# Patient Record
Sex: MALE | Race: WHITE | NOT HISPANIC OR LATINO | Employment: FULL TIME | URBAN - METROPOLITAN AREA
[De-identification: names, ages, dates, MRNs, and addresses within clinical notes are randomized per-mention and may not be internally consistent; named-entity substitution may affect disease eponyms.]

---

## 2017-08-16 ENCOUNTER — OFFICE VISIT (OUTPATIENT)
Dept: DERMATOLOGY | Facility: CLINIC | Age: 37
End: 2017-08-16

## 2017-08-16 DIAGNOSIS — D22.9 MULTIPLE NEVI: Primary | ICD-10-CM

## 2017-08-16 ASSESSMENT — PAIN SCALES - GENERAL: PAINLEVEL: NO PAIN (0)

## 2017-08-16 NOTE — PROGRESS NOTES
[facility] Dermatology Note      Dermatology Problem List:  1. Non inflamed cyst of left temple  2. Nevi  3. Family history of skin cancer    CC:   Chief Complaint   Patient presents with     Skin Check     Dmitri is here for a skin check, states he has a concerning spot on his left temple.               Encounter Date: Aug 16, 2017    History of Present Illness:  Mr. Dmitri Higginbotham is a 36 year old male who presents for a skin check.  His last skin check was in 2009 with Dr Veloz.  He had a rash of the axillae at that time that now he attributes to deoderants and he has been working with different products.  He is concerned about a lesion on the left temple.  He felt sometime ago he had an ingrown hair that became infected and since that time he has had a bump in that area.  He has some moles removed from his back in the past but none with atypia per patient.    Past Medical History:   There is no problem list on file for this patient.    History reviewed. No pertinent past medical history.  History reviewed. No pertinent surgical history.    Social History:  The patient denies use of tanning beds.    Family History:  There is a family history of non-melanoma skin cancer in the patient's maternal grandfather.    Medications:  Current Outpatient Prescriptions   Medication Sig Dispense Refill     Amphetamine-Dextroamphetamine (ADDERALL PO) Take by mouth as needed       No Known Allergies      Review of Systems:  -As per HPI  .  -Skin: As above in HPI. No additional skin concerns.    Physical exam:  Vitals: There were no vitals taken for this visit.  GEN: This is a well developed, well-nourished male in no acute distress, in a pleasant mood.    SKIN: Full skin, which includes the head/face, both arms, chest, back, abdomen,both legs, buttocks, digits and/or nails, was examined.  -Multiple regular brown pigmented macules and papules are identified on the face, trunk, and extremities.  Congenital  appearing nevi of back.  2 scars of the back but no pigment recurrence.   - cystic non inflamed papule of left temple with 2 closed comedones  -There are yellow oily papules with central umbilication located on the face.  -No other lesions of concern on areas examined.     Impression/Plan:  1. 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       2. Rash    Probable ACD to deoderents.  Reviewed patch testing if necessary    Patient doing OK with different products    3. Cyst    Cyst management options were reviewed. The patient elects for clinical monitoring.    Differin gel for comedone management nightly and may use comedone extractor.          Follow-up in 1 year, earlier for new or changing lesions.       Staff Involved:  Staff Only

## 2017-08-16 NOTE — NURSING NOTE
Dermatology Rooming Note    Dmitri Higginbotham's goals for this visit include:   Chief Complaint   Patient presents with     Skin Check     Dmitri is here for a skin check, states he has a concerning spot on his left temple.             Carol Harp LPN

## 2017-08-16 NOTE — LETTER
Date:August 17, 2017      Patient was self referred, no letter generated. Do not send.        [facility] Health Information

## 2017-08-16 NOTE — PATIENT INSTRUCTIONS
Differin gel for small cyst (blackhead) area on left cheek.  Use a small amount at bedtime.    Can use a comedone (blackhead) extractor to remove blackheads.

## 2017-08-16 NOTE — LETTER
8/16/2017       RE: Dmitri Higginbotham  [address]  [place] [address]     Dear Colleague,    Thank you for referring your patient, Dmitri Higginbotham, to the [facility] DERMATOLOGY at [facility]. Please see a copy of my visit note below.    [facility] Dermatology Note      Dermatology Problem List:  1. Non inflamed cyst of left temple  2. Nevi  3. Family history of skin cancer    CC:   Chief Complaint   Patient presents with     Skin Check     Dmitri is here for a skin check, states he has a concerning spot on his left temple.               Encounter Date: Aug 16, 2017    History of Present Illness:  Mr. Dmitri Higginbotham is a 36 year old male who presents for a skin check.  His last skin check was in 2009 with Dr Veloz.  He had a rash of the axillae at that time that now he attributes to deoderants and he has been working with different products.  He is concerned about a lesion on the left temple.  He felt sometime ago he had an ingrown hair that became infected and since that time he has had a bump in that area.  He has some moles removed from his back in the past but none with atypia per patient.    Past Medical History:   There is no problem list on file for this patient.    History reviewed. No pertinent past medical history.  History reviewed. No pertinent surgical history.    Social History:  The patient denies use of tanning beds.    Family History:  There is a family history of non-melanoma skin cancer in the patient's maternal grandfather.    Medications:  Current Outpatient Prescriptions   Medication Sig Dispense Refill     Amphetamine-Dextroamphetamine (ADDERALL PO) Take by mouth as needed       No Known Allergies      Review of Systems:  -As per HPI  .  -Skin: As above in HPI. No additional skin concerns.    Physical exam:  Vitals: There were no vitals taken for this visit.  GEN: This is a well developed, well-nourished male in no acute distress, in a  pleasant mood.    SKIN: Full skin, which includes the head/face, both arms, chest, back, abdomen,both legs, buttocks, digits and/or nails, was examined.  -Multiple regular brown pigmented macules and papules are identified on the face, trunk, and extremities.  Congenital appearing nevi of back.  2 scars of the back but no pigment recurrence.   - cystic non inflamed papule of left temple with 2 closed comedones  -There are yellow oily papules with central umbilication located on the face.  -No other lesions of concern on areas examined.     Impression/Plan:  1. 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       2. Rash    Probable ACD to deoderents.  Reviewed patch testing if necessary    Patient doing OK with different products    3. Cyst    Cyst management options were reviewed. The patient elects for clinical monitoring.    Differin gel for comedone management nightly and may use comedone extractor.          Follow-up in 1 year, earlier for new or changing lesions.       Staff Involved:  Staff Only    Again, thank you for allowing me to participate in the care of your patient.      Sincerely,    Glendy Clemens MD

## 2017-08-16 NOTE — MR AVS SNAPSHOT
After Visit Summary   8/16/2017    Dmitri Higginbotham    MRN: [id number]           Patient Information     Date Of Birth          1980        Visit Information        Provider Department      8/16/2017 8:15 AM Glendy Clemens MD [facility] Dermatology        Care Instructions    Differin gel for small cyst (blackhead) area on left cheek.  Use a small amount at bedtime.    Can use a comedone (blackhead) extractor to remove blackheads.          Follow-ups after your visit        Follow-up notes from your care team     Return in about 1 year (around 8/16/2018).      Who to contact     Please call your clinic at [phone] to:    Ask questions about your health    Make or cancel appointments    Discuss your medicines    Learn about your test results    Speak to your doctor   If you have compliments or concerns about an experience at your clinic, or if you wish to file a complaint, please contact [facility] Patient Relations at [phone] or email us at [email or web address]         Additional Information About Your Visit        Care EveryWhere ID     This is your Care EveryWhere ID. This could be used by other organizations to access your [facility] medical records  [id number]         Blood Pressure from Last 3 Encounters:   No data found for BP    Weight from Last 3 Encounters:   No data found for Wt              Today, you had the following     No orders found for display       Primary Care Provider    Doctor Unknown, MD       No address on file        Equal Access to Services     [facility] : Hadii aad ku hadasho Soomaali, waaxda luqadaha, qaybta kaalmada adeegyada, alex burgess . So [facility] [phone].    ATENCIÓN: Si habla español, tiene a chinchilla disposición servicios gratuitos de asistencia lingüística. Llame al [phone].    We comply with applicable federal civil rights laws and [place] laws. We do not discriminate on the  basis of race, color, national origin, age, disability sex, sexual orientation or gender identity.            Thank you!     Thank you for choosing [facility] DERMATOLOGY  for your care. Our goal is always to provide you with excellent care. Hearing back from our patients is one way we can continue to improve our services. Please take a few minutes to complete the written survey that you may receive in the mail after your visit with us. Thank you!             Your Updated Medication List - Protect others around you: Learn how to safely use, store and throw away your medicines at www.disposemymeds.org.          This list is accurate as of: 8/16/17  8:34 AM.  Always use your most recent med list.                   Brand Name Dispense Instructions for use Diagnosis    ADDERALL PO      Take by mouth as needed

## 2018-10-08 ENCOUNTER — OFFICE VISIT (OUTPATIENT)
Dept: DERMATOLOGY | Facility: CLINIC | Age: 38
End: 2018-10-08
Payer: COMMERCIAL

## 2018-10-08 DIAGNOSIS — D18.01 CHERRY ANGIOMA: ICD-10-CM

## 2018-10-08 DIAGNOSIS — L73.8 SENILE SEBACEOUS GLAND HYPERPLASIA: ICD-10-CM

## 2018-10-08 DIAGNOSIS — D22.9 MULTIPLE BENIGN MELANOCYTIC NEVI: Primary | ICD-10-CM

## 2018-10-08 DIAGNOSIS — B35.3 TINEA PEDIS OF RIGHT FOOT: ICD-10-CM

## 2018-10-08 ASSESSMENT — PAIN SCALES - GENERAL: PAINLEVEL: NO PAIN (0)

## 2018-10-08 NOTE — MR AVS SNAPSHOT
After Visit Summary   10/8/2018    Dmitri Higginbotham    MRN: [id number]           Patient Information     Date Of Birth          1980        Visit Information        Provider Department      10/8/2018 8:45 AM Glendy Clemens MD [facility] Dermatology        Care Instructions    Can use Lotrimin cream on the scaling area on your feet twice a day until it is gone (will probably take at least 2 weeks).    The ABCDEs of Melanoma    Skin cancer can develop anywhere on the skin. Ask someone for help when checking your skin, especially in hard to see places. If you notice a mole different from others, or that changes, enlarges, itches, or bleeds (even if it is small), you should see a dermatologist.                  Follow-ups after your visit        Follow-up notes from your care team     Return in about 1 year (around 10/8/2019).      Who to contact     Please call your clinic at [phone] to:    Ask questions about your health    Make or cancel appointments    Discuss your medicines    Learn about your test results    Speak to your doctor            Additional Information About Your Visit        MyChart Information     [employer] is an electronic gateway that provides easy, online access to your medical records. With [employer], you can request a clinic appointment, read your test results, renew a prescription or communicate with your care team.     To sign up for [employer] visit the website at [email or web address]   You will be asked to enter the access code listed below, as well as some personal information. Please follow the directions to create your username and password.     Your access code is: [email or web address]  Expires: 2018  6:31 AM     Your access code will  in 90 days. If you need help or a new code, please contact your [facility] or call [phone] for assistance.        Care EveryWhere ID     This is your Care EveryWhere ID. This could be used  by other organizations to access your [facility] medical records  [id number]         Blood Pressure from Last 3 Encounters:   No data found for BP    Weight from Last 3 Encounters:   No data found for Wt              Today, you had the following     No orders found for display       Primary Care Provider    None Specified       No primary provider on file.        Equal Access to Services     [facility] : Hadii zuleyma bazan hadhango Soomaali, waaxda luqadaha, qaybta kaalmada adeegyada, alex messermikejian burgess . So [facility] [phone].    ATENCIÓN: Si habla español, tiene a chinchilla disposición servicios gratuitos de asistencia lingüística. Llame al [phone].    We comply with applicable federal civil rights laws and [place] laws. We do not discriminate on the basis of race, color, national origin, age, disability, sex, sexual orientation, or gender identity.            Thank you!     Thank you for choosing [facility] DERMATOLOGY  for your care. Our goal is always to provide you with excellent care. Hearing back from our patients is one way we can continue to improve our services. Please take a few minutes to complete the written survey that you may receive in the mail after your visit with us. Thank you!             Your Updated Medication List - Protect others around you: Learn how to safely use, store and throw away your medicines at www.disposemymeds.org.          This list is accurate as of 10/8/18  9:18 AM.  Always use your most recent med list.                   Brand Name Dispense Instructions for use Diagnosis    ADDERALL PO      Take by mouth as needed

## 2018-10-08 NOTE — PROGRESS NOTES
[facility] Dermatology Note      Dermatology Problem List:  1. Non inflamed cyst of left temple  2. Nevi  3. Family history of skin cancer - melanoma in maternal grandfather    Encounter Date: Oct 8, 2018    CC:   Chief Complaint   Patient presents with     Derm Problem     Dmitri is here for a skin check, states no concerns today.        History of Present Illness:  Mr. Dmitri Higginbotham is a 37 year old male who presents as a follow up patient for full skin examination. He has a family history of melanoma in his maternal grandfather. His last office visit was 08/2017, at which time no concerning areas were found. He denies any spots that are changing, bleeding, tender, increasing in size, color, or itching. He does use sunscreen when outdoors.    Past Medical History:   There is no problem list on file for this patient.    No past medical history on file.  No past surgical history on file.    Social History:   reports that he has never smoked. He has never used smokeless tobacco.    Family History:  Family History   Problem Relation Age of Onset     Skin Cancer Maternal Grandfather      Melanoma No family hx of        Medications:  Current Outpatient Prescriptions   Medication Sig Dispense Refill     Amphetamine-Dextroamphetamine (ADDERALL PO) Take by mouth as needed          No Known Allergies      Review of Systems:  -As per HPI  -Constitutional: The patient denies fatigue, fevers, chills, unintended weight loss, and night sweats.  -HEENT: Patient denies nonhealing oral sores.  -Skin: As above in HPI. No additional skin concerns.    Physical exam:  Vitals: There were no vitals taken for this visit.  GEN: This is a well developed, well-nourished female in no acute distress, in a pleasant mood.    SKIN: Full skin, which includes the head/face, both arms, chest, back, abdomen,both legs, groin, buttocks, digits and/or nails, was examined.  -There are dome shaped bright red papules on the areas  examined.   -There are yellow oily papules with central umbilication located on the cheeks and forehead.  -Multiple regular brown pigmented macules and papules are identified on the face, trunk and extremities. Many on the back are red/brown in color and >6mm but no concerning features under dermoscopy.  -Scaling in right 3rd/4th webspace  -No other lesions of concern on areas examined.     Impression/Plan:  1. Multiple clinically benign nevi on the face, trunk and extremities    ABCDs of melanoma were discussed and self skin checks were advised.     Return to clinic for annual skin examinations    2. Sebaceous hyperplasia - face    Reviewed etiology and natural course    No treatment indicated     3. Cherry angioma(s)    Reviewed etiology and natural course    No treatment indicated     4. Tinea pedis     Reviewed etiology and natural course    Recommend Lotrimin over the counter cream BID until resolved.      Follow-up in 1 year, earlier for new or changing lesions.        staffed the patient.    Staff Involved:  Resident(Gisela Leach)/Staff(as above)  .I, Glendy Clemens MD, saw this patient with the resident and agree with the resident s findings and plan of care as documented in the resident s note.

## 2018-10-08 NOTE — LETTER
Date:October 9, 2018      Patient was self referred, no letter generated. Do not send.        [facility] Health Information

## 2018-10-08 NOTE — PATIENT INSTRUCTIONS
Can use Lotrimin cream on the scaling area on your feet twice a day until it is gone (will probably take at least 2 weeks).    The ABCDEs of Melanoma    Skin cancer can develop anywhere on the skin. Ask someone for help when checking your skin, especially in hard to see places. If you notice a mole different from others, or that changes, enlarges, itches, or bleeds (even if it is small), you should see a dermatologist.

## 2018-10-08 NOTE — NURSING NOTE
Dermatology Rooming Note    Dmitri Higginbotham's goals for this visit include:   Chief Complaint   Patient presents with     Derm Problem     Dmitri is here for a skin check, states no concerns today.        Carol Harp LPN

## 2018-10-08 NOTE — LETTER
10/8/2018       RE: Dmitri Higginbotham  [address]  [place] [address]     Dear Colleague,    Thank you for referring your patient, Dmitri Higginbotham, to the [facility] DERMATOLOGY at [facility]. Please see a copy of my visit note below.    [facility] Dermatology Note      Dermatology Problem List:  1. Non inflamed cyst of left temple  2. Nevi  3. Family history of skin cancer - melanoma in maternal grandfather    Encounter Date: Oct 8, 2018    CC:   Chief Complaint   Patient presents with     Derm Problem     Dmitri is here for a skin check, states no concerns today.        History of Present Illness:  Mr. Dmitri Higginbotham is a 37 year old male who presents as a follow up patient for full skin examination. He has a family history of melanoma in his maternal grandfather. His last office visit was 08/2017, at which time no concerning areas were found. He denies any spots that are changing, bleeding, tender, increasing in size, color, or itching. He does use sunscreen when outdoors.    Past Medical History:   There is no problem list on file for this patient.    No past medical history on file.  No past surgical history on file.    Social History:   reports that he has never smoked. He has never used smokeless tobacco.    Family History:  Family History   Problem Relation Age of Onset     Skin Cancer Maternal Grandfather      Melanoma No family hx of        Medications:  Current Outpatient Prescriptions   Medication Sig Dispense Refill     Amphetamine-Dextroamphetamine (ADDERALL PO) Take by mouth as needed          No Known Allergies      Review of Systems:  -As per HPI  -Constitutional: The patient denies fatigue, fevers, chills, unintended weight loss, and night sweats.  -HEENT: Patient denies nonhealing oral sores.  -Skin: As above in HPI. No additional skin concerns.    Physical exam:  Vitals: There were no vitals taken for this visit.  GEN: This is  a well developed, well-nourished female in no acute distress, in a pleasant mood.    SKIN: Full skin, which includes the head/face, both arms, chest, back, abdomen,both legs, groin, buttocks, digits and/or nails, was examined.  -There are dome shaped bright red papules on the areas examined.   -There are yellow oily papules with central umbilication located on the cheeks and forehead.  -Multiple regular brown pigmented macules and papules are identified on the face, trunk and extremities. Many on the back are red/brown in color and >6mm but no concerning features under dermoscopy.  -Scaling in right 3rd/4th webspace  -No other lesions of concern on areas examined.     Impression/Plan:  1. Multiple clinically benign nevi on the face, trunk and extremities    ABCDs of melanoma were discussed and self skin checks were advised.     Return to clinic for annual skin examinations    2. Sebaceous hyperplasia - face    Reviewed etiology and natural course    No treatment indicated     3. Cherry angioma(s)    Reviewed etiology and natural course    No treatment indicated     4. Tinea pedis     Reviewed etiology and natural course    Recommend Lotrimin over the counter cream BID until resolved.      Follow-up in 1 year, earlier for new or changing lesions.        staffed the patient.    Staff Involved:  Resident(Gisela Leach)/Staff(as above)  .I, Glendy Clemens MD, saw this patient with the resident and agree with the resident s findings and plan of care as documented in the resident s note.      Again, thank you for allowing me to participate in the care of your patient.      Sincerely,    Glendy Clemens MD

## 2019-09-13 ENCOUNTER — OFFICE VISIT (OUTPATIENT)
Dept: URGENT CARE | Facility: URGENT CARE | Age: 39
End: 2019-09-13
Payer: COMMERCIAL

## 2019-09-13 VITALS
TEMPERATURE: 97.1 F | HEART RATE: 99 BPM | OXYGEN SATURATION: 98 % | WEIGHT: 187 LBS | SYSTOLIC BLOOD PRESSURE: 132 MMHG | DIASTOLIC BLOOD PRESSURE: 80 MMHG

## 2019-09-13 DIAGNOSIS — T78.40XA ALLERGIC REACTION, INITIAL ENCOUNTER: Primary | ICD-10-CM

## 2019-09-13 DIAGNOSIS — H10.33 ACUTE CONJUNCTIVITIS OF BOTH EYES, UNSPECIFIED ACUTE CONJUNCTIVITIS TYPE: ICD-10-CM

## 2019-09-13 PROCEDURE — 99203 OFFICE O/P NEW LOW 30 MIN: CPT | Mod: 25

## 2019-09-13 PROCEDURE — 96372 THER/PROPH/DIAG INJ SC/IM: CPT | Mod: 59

## 2019-09-13 RX ORDER — METHYLPREDNISOLONE SOD SUCC 125 MG
80 VIAL (EA) INJECTION ONCE
Status: COMPLETED | OUTPATIENT
Start: 2019-09-13 | End: 2019-09-13

## 2019-09-13 RX ORDER — POLYMYXIN B SULFATE AND TRIMETHOPRIM 1; 10000 MG/ML; [USP'U]/ML
1-2 SOLUTION OPHTHALMIC EVERY 4 HOURS
Qty: 5 ML | Refills: 0 | Status: SHIPPED | OUTPATIENT
Start: 2019-09-13 | End: 2019-09-20

## 2019-09-13 RX ORDER — DIPHENHYDRAMINE HYDROCHLORIDE 50 MG/ML
50 INJECTION INTRAMUSCULAR; INTRAVENOUS ONCE
Status: COMPLETED | OUTPATIENT
Start: 2019-09-13 | End: 2019-09-13

## 2019-09-13 RX ADMIN — Medication 80 MG: at 19:49

## 2019-09-13 RX ADMIN — DIPHENHYDRAMINE HYDROCHLORIDE 50 MG: 50 INJECTION INTRAMUSCULAR; INTRAVENOUS at 19:49

## 2019-09-13 ASSESSMENT — ENCOUNTER SYMPTOMS
BLURRED VISION: 0
CARDIOVASCULAR NEGATIVE: 1
RESPIRATORY NEGATIVE: 1
EYE DISCHARGE: 1
NEUROLOGICAL NEGATIVE: 1
EYE REDNESS: 1
MUSCULOSKELETAL NEGATIVE: 1
GASTROINTESTINAL NEGATIVE: 1

## 2019-09-14 NOTE — PATIENT INSTRUCTIONS
Patient Education     Medicine Reaction: Allergic  You are having an allergic reaction to a medicine you have taken. This causes an itchy rash and sometimes swelling of various parts of the body. It may also cause trouble swallowing or breathing. The rash may take a few hours or up to 2 weeks to go away. In the future, remember to tell your healthcare provider about your allergy to this medicine so that medicines of this type won't be used again.  Any medicine can cause an allergic reaction. But the most allergic reactions are caused by:    Penicillin and related medicines    Aspirin    Ibuprofen    Seizure medicines  Vaccines may also trigger allergies. People whose parents or siblings have allergies are at a higher risk of developing a medicine allergy. Allergy testing may sometimes be needed to figure out the cause.  Symptoms may occur within minutes, hours, or even weeks after exposure to the medicine. It can be a mild or severe reaction, or potentially life threatening. Most of us think of allergic reactions when we have a rash or itchy skin. Symptoms can include:    Rash, hives, redness, welts, blisters    Itching, burning, stinging, pain    Dry, flaky, cracking, scaly skin    Belly (abdominal) cramps or nausea or stomach pain    Fever.  Sometimes fever is the only symptom of a drug reaction. In older adults, the risk of fever increases with the number of medicines the person takes.  More severe symptoms include:    Swelling of the face or lips, or drooling    Trouble swallowing, feeling like your throat is closing    Trouble breathing, wheezing    Hoarse voice or trouble speaking    Severe nausea or vomiting or diarrhea      Feeling faint or lightheaded, rapid heart rate  Home care    The goal of treatment is to help relieve the symptoms, and get you feeling better. Mild to medium medicine reactions usually respond quickly to antihistamines and steroids. The rash will usually fade over several days. But it  can sometimes last a couple of weeks. Over the next couple of days, there may be times when it is gets a little worse, and then better again. Here are some things to do:    Throw the medicine away and don t take it again. The next reaction may be much worse.    Add this medicine reaction to your electronic medical record.    When getting a new medicine, always tell the healthcare provider that you are allergic to this medicine. Make certain the provider writes it down in your medical record.    Avoid tight clothing and anything that heats up your skin (hot showers or baths, direct sunlight). Heat will make itching worse.    An ice pack will relieve local areas of intense itching and redness. To make an ice pack, put ice cubes in a plastic bag that seals at the top. Wrap the bag in a clean, thin towel or cloth. Don t put ice directly on the skin.    To help prevent an infection, don't scratch the affected area. Scratching may worsen the reaction. It can damage your skin and lead to an infection. Always check the affected site for signs of an infection.    Your provider may give you a prescription antihistamine.    If you are not given a prescription antihistamine, oral diphenhydramine is an over-the-counter antihistamine available at pharmacies and grocery stores. This may be used to reduce itching if large areas of the skin are involved. This antihistamine may make you sleepy, so be careful using it in the daytime or when going to school, working, or driving. Note: Don t use diphenhydramine if you have glaucoma or if you are a man with trouble urinating due to an enlarged prostate. There are other antihistamines that cause less drowsiness and are a good choice for daytime use. Ask your pharmacist for suggestions.    Don't use diphenhydramine cream on your skin. It can cause a further skin reaction for some people.    Contact your healthcare provider and ask what can be used on the affected area to help decrease the  itching.  Follow-up care  Follow up with your healthcare provider, or as advised if your symptoms do not continue to improve or they get worse.  Call 911  Call 911 if any of these occur:    Shortness of breath    Cool, moist, pale skin    Swelling in the face, eyelids, mouth, tongue, or lips    Drooling    Trouble breathing or swallowing, wheezing    New or worsening swelling in the mouth, throat, or tongue    Hoarse voice or trouble speaking     Fainting or loss of consciousness    Rapid heart rate    Feeling of dizziness or weakness or a sudden drop in blood pressure    Feeling of doom    Feeling lightheaded    Severe nausea, vomiting, or diarrhea  When to seek medical advice  Call your healthcare provider right away if any of these occur:    Continuing or recurring symptoms    Nausea, abdominal cramps or stomach pain    Spreading areas of itching, redness or swelling    Signs of infection:  ? Spreading redness  ? Increased pain or swelling  ? Fever of 100.4 F (38 C) or above lasting for 24 to 48 hours, or as directed by your provider  ? Fluid or colored drainage from the affected area  Date Last Reviewed: 3/1/2017    9520-6946 [employer]. [address] [place] [address]. All rights reserved. This information is not intended as a substitute for professional medical care. Always follow your healthcare professional's instructions.

## 2019-09-14 NOTE — PROGRESS NOTES
HPI  Dmitri Higginbotham 38 year old presents to the urgent care with possible allergic reaction.  He reports that when he woke up this morning he had a rash on his ankles bilaterally.  It was red, itchy, and lasted for couple of hours.  The symptoms have completely resolved.  At 11:00 this morning he had a flu shot.  At approximately 1 to 2:00 in the afternoon he noted his face felt a little puffy but he resumed his work with no significant distress other than some bilateral eye irritation when his wife saw him after work she became quite concerned and they presented to the urgent care for further evaluation.  Patient states he is not short of breath, wheezing, or having difficulty swallowing or talking.  He does endorse feeling like his lips are mildly swollen.  He has not taken any medications or home remedies prior to coming into the urgent care.    Past Medical History:   Diagnosis Date     ADHD       There is no problem list on file for this patient.   History reviewed. No pertinent surgical history.   No Known Allergies  Current Outpatient Medications   Medication     Amphetamine-Dextroamphetamine (ADDERALL PO)     trimethoprim-polymyxin b (POLYTRIM) 74316-2.1 UNIT/ML-% ophthalmic solution     No current facility-administered medications for this visit.          Review of Systems   Constitutional: Positive for malaise/fatigue.   HENT: Negative.    Eyes: Positive for discharge and redness. Negative for blurred vision.   Respiratory: Negative.    Cardiovascular: Negative.    Gastrointestinal: Negative.    Genitourinary: Negative.    Musculoskeletal: Negative.    Skin: Negative.         The rash on the ankles from this morning has completely dissipated   Neurological: Negative.    Endo/Heme/Allergies: Negative.          Physical Exam   Constitutional: He is oriented to person, place, and time. He appears well-developed. No distress.   /80   Pulse 99   Temp 97.1  F (36.2  C) (Oral)   Wt 84.8 kg (187 lb)    SpO2 98%      HENT:   Airway is widely patent with no swelling in the mouth or tongue.  It does appear that the upper lip may have a slight swelling to it and the patient's wife confirms this   Eyes: Pupils are equal, round, and reactive to light. EOM and lids are normal. Right eye exhibits discharge. Left eye exhibits discharge. Right conjunctiva is injected. Left conjunctiva is injected.   Neck: Normal range of motion. No JVD present. No tracheal deviation present.   Cardiovascular: Normal rate, regular rhythm and normal heart sounds.   Pulmonary/Chest: Effort normal and breath sounds normal. No stridor. No respiratory distress. He has no wheezes.   Good air exchange noted in all lung fields   Abdominal: Soft. There is no tenderness.   Musculoskeletal: Normal range of motion.   Lymphadenopathy:     He has no cervical adenopathy.   Neurological: He is alert and oriented to person, place, and time.   Skin: Skin is warm and dry. No rash noted. No erythema.   Nursing note and vitals reviewed.    Assessment:  1. Allergic reaction, initial encounter    2. Acute conjunctivitis of both eyes, unspecified acute conjunctivitis type        Plan:  Orders Placed This Encounter     diphenhydrAMINE (BENADRYL) injection 50 mg     methylPREDNISolone sodium succinate (solu-MEDROL) injection 80 mg     trimethoprim-polymyxin b (POLYTRIM) 73649-1.1 UNIT/ML-% ophthalmic solution   Benadryl and Solumedrol in Urgent Care and observed for 1 hour  No further symptoms development and lip swelling resolved  Benadryl 25-50 mg as directed on package as needed for the next couple days  Report to influenza  that you had a potential allergic/adverse reaction to the flu shot.  Instructions regarding self-care of patient/child reviewed.   Written instructions provided in after visit summary and reviewed.  Patient instructed to see primary care provider for new or persistent symptoms.   Red flag symptoms reviewed and patient has been  instructed to seek emergent care  Please contact pharmacy for medication questions.  Patient instructed to take medications as directed on package.      Janette Suarez, DNP, APRN, CNP

## 2020-01-25 ENCOUNTER — NURSE TRIAGE (OUTPATIENT)
Dept: NURSING | Facility: CLINIC | Age: 40
End: 2020-01-25

## 2020-01-25 ENCOUNTER — HOSPITAL ENCOUNTER (EMERGENCY)
Facility: CLINIC | Age: 40
Discharge: HOME | End: 2020-01-25
Attending: EMERGENCY MEDICINE | Admitting: EMERGENCY MEDICINE
Payer: COMMERCIAL

## 2020-01-25 VITALS
WEIGHT: 188 LBS | DIASTOLIC BLOOD PRESSURE: 74 MMHG | RESPIRATION RATE: 18 BRPM | HEIGHT: 72 IN | HEART RATE: 78 BPM | TEMPERATURE: 98 F | BODY MASS INDEX: 25.47 KG/M2 | SYSTOLIC BLOOD PRESSURE: 111 MMHG | OXYGEN SATURATION: 100 %

## 2020-01-25 DIAGNOSIS — G44.009 CLUSTER HEADACHE, NOT INTRACTABLE, UNSPECIFIED CHRONICITY PATTERN: ICD-10-CM

## 2020-01-25 PROCEDURE — 25000131 ZZH RX MED GY IP 250 OP 636 PS 637: Performed by: EMERGENCY MEDICINE

## 2020-01-25 PROCEDURE — 99283 EMERGENCY DEPT VISIT LOW MDM: CPT

## 2020-01-25 RX ORDER — SUMATRIPTAN 6 MG/.5ML
6 INJECTION, SOLUTION SUBCUTANEOUS ONCE
Status: DISCONTINUED | OUTPATIENT
Start: 2020-01-25 | End: 2020-01-25 | Stop reason: HOSPADM

## 2020-01-25 RX ORDER — PREDNISONE 20 MG/1
60 TABLET ORAL DAILY
Status: DISCONTINUED | OUTPATIENT
Start: 2020-01-25 | End: 2020-01-25 | Stop reason: HOSPADM

## 2020-01-25 RX ORDER — PREDNISONE 50 MG/1
50 TABLET ORAL DAILY
Qty: 5 TABLET | Refills: 0 | Status: SHIPPED | OUTPATIENT
Start: 2020-01-25 | End: 2020-01-30

## 2020-01-25 RX ORDER — PSEUDOEPHEDRINE HCL 30 MG
TABLET ORAL EVERY 4 HOURS PRN
COMMUNITY

## 2020-01-25 RX ORDER — SUMATRIPTAN 6 MG/.5ML
6 INJECTION, SOLUTION SUBCUTANEOUS EVERY 12 HOURS PRN
Qty: 0.5 ML | Refills: 0 | Status: SHIPPED | OUTPATIENT
Start: 2020-01-25

## 2020-01-25 RX ORDER — IBUPROFEN 200 MG
400 TABLET ORAL EVERY 4 HOURS PRN
COMMUNITY

## 2020-01-25 RX ADMIN — PREDNISONE 60 MG: 20 TABLET ORAL at 11:40

## 2020-01-25 ASSESSMENT — MIFFLIN-ST. JEOR: SCORE: 1800.76

## 2020-01-25 ASSESSMENT — ENCOUNTER SYMPTOMS
FEVER: 0
NECK PAIN: 1
CHILLS: 0
NECK STIFFNESS: 1
HEADACHES: 1
FACIAL SWELLING: 1
RHINORRHEA: 0

## 2020-01-25 NOTE — TELEPHONE ENCOUNTER
Wife and patient calling denies triage. Regarding cluster headaches and treatment. States he gets them about every 2 years. Is wondering about oxygen therapy. Advised ER for evaluation.Patient has only been seen at  once in .   Lisa Rick RN [facility]

## 2020-01-25 NOTE — ED AVS SNAPSHOT
Emergency Department  [address] [place] [id number]  Phone:  [phone]  Fax:  [phone]                                    Dmitri Higginbotham   MRN: [id number]    Department:   Emergency Department   Date of Visit:  1/25/2020           After Visit Summary Signature Page    I have received my discharge instructions, and my questions have been answered. I have discussed any challenges I see with this plan with the nurse or doctor.    ..........................................................................................................................................  Patient/Patient Representative Signature      ..........................................................................................................................................  Patient Representative Print Name and Relationship to Patient    ..................................................               ................................................  Date                                   Time    ..........................................................................................................................................  Reviewed by Signature/Title    ...................................................              ..............................................  Date                                               Time          22EPIC Rev 08/18

## 2020-01-25 NOTE — ED PROVIDER NOTES
History     Chief Complaint:  Headache     HPI   Dmitri Higginbotham is a 39 year old male with a history of cluster headaches who presents to the ED for evaluation of headache that feels similar to previous cluster headaches. The patient reports mild headache that began last night and has significantly worsened this morning to a sharp pain behind his right eye. He also has facial swelling, right eye watering, and trouble moving his head due to pain and stiffness. He reports having cluster headaches every 1 to 3 years that last about 3 months to one month at a time and normally takes Imitrex and steroids as well as at-home oxygen. He has seen a neurologist in the past and was instructed to use oxygen for 15 minutes on high flow, which he claims helps. A past brain MRI returned normal. The patient denies a running nose, as well as trauma to the head or eye, fever, or chills. His last series of cluster headaches was January three years ago.     Allergies:  No known drug allergies     Medications:    Adderall     Past Medical History:    ADHD  Cluster headaches    Past Surgical History:    History reviewed. No pertinent surgical history.     Family History:    History reviewed. No pertinent family history.      Social History:  Smoking status: Never  Alcohol use: Yes  Drug use: No  Patient presents with wife.  PCP: No primary care provider on file.    Marital Status:       Review of Systems   Constitutional: Negative for chills and fever.   HENT: Positive for facial swelling. Negative for rhinorrhea.    Eyes:        Right eye watering   Musculoskeletal: Positive for neck pain and neck stiffness.   Neurological: Positive for headaches.   All other systems reviewed and are negative.        Physical Exam     Patient Vitals for the past 24 hrs:   BP Temp Temp src Pulse Resp SpO2 Height Weight   01/25/20 1213 111/74 -- -- 78 -- 100 % -- --   01/25/20 1211 111/74 -- -- 78 -- -- -- --   01/25/20 1054 111/70 98  F (36.7  C)  Oral 70 18 100 % 1.829 m (6') 85.3 kg (188 lb)      Physical Exam  GENERAL: Looks uncomfortable in a darkened room.   HEAD: atraumatic  EYES: pupils reactive, extraocular muscles intact, conjunctivae normal  ENT:  mucus membranes moist  NECK:  trachea midline, normal range of motion  RESPIRATORY: no tachypnea, breath sounds clear to auscultation   CVS: normal S1/S2, no murmurs, intact distal pulses  ABDOMEN: soft, nontender, nondistention  MUSCULOSKELETAL: no deformities  SKIN: warm and dry, no acute rashes or ulceration  NEURO: GCS 15, cranial nerves intact, alert and oriented x3  PSYCH:  Mood/affect normal    Emergency Department Course     Emergency Department Course:  Past medical records, nursing notes, and vitals reviewed.  1036: I performed an exam of the patient and obtained history, as documented above.    1058: I talked to the hospitalist regarding ordering oxygen for the patient.    1112: I spoke with social work regarding the oxygen as well.     1202: I rechecked the patient. Findings and plan explained to the Patient. Patient discharged home with instructions regarding supportive care, medications, and reasons to return. The importance of close follow-up was reviewed.       Impression & Plan      Medical Decision Making:  Patient presents with headache consistent with prior cluster headaches which she has been diagnosed by a neurologist at the [facility].  He notes rhinorrhea and tearing of the right eye in association with right-sided headache.  He notes response to Imitrex, high flow oxygen and prednisone.  These are all consistent with up-to-date suggestions for treatment of migraines.  Patient took Imitrex prior to arrival that his wife had and was given high flow oxygen with improvement of symptoms.  Did call  and attempt to order home oxygen which is typically been a challenge.  Orders were made.  Received a call from the wife that she had contacted [facility] and they were  requesting a prescription, so a handwritten prescription was faxed to them.  Oxygen will be for 15 L high flow for 15 minutes as needed for headache.    Diagnosis:    ICD-10-CM    1. Cluster headache, not intractable, unspecified chronicity pattern G44.009      Disposition:  Discharged to home.     Discharge Medications:   Details   predniSONE (DELTASONE) 50 MG tablet Take 1 tablet (50 mg) by mouth daily for 5 days, Disp-5 tablet, R-0, Local Print      SUMAtriptan (IMITREX) 6 MG/0.5ML injection Inject 0.5 mLs (6 mg) Subcutaneous every 12 hours as needed for migraine May repeat dose in 1 hour. Max 12 mg/24 hours., Disp-0.5 mL, R-0, Local Print        Edenilson Blount  1/25/2020    EMERGENCY DEPARTMENT    Scribe Disclosure:  Edenilson MARTINEZ Chi, am serving as a scribe at 11:08 AM on 1/25/2020 to document services personally performed by Emre Atkinson MD based on my observations and the provider's statements to me.       Scribe Disclosure:  Kristin MARTINEZ, am serving as a scribe at 11:08 AM on 1/25/2020 to document services personally performed by Emre Atkinson MD based on my observations and the provider's statements to me.        Emre Atkinson MD  01/25/20 1929

## 2023-06-27 ENCOUNTER — MEDICAL CORRESPONDENCE (OUTPATIENT)
Dept: HEALTH INFORMATION MANAGEMENT | Facility: CLINIC | Age: 43
End: 2023-06-27
Payer: COMMERCIAL

## 2023-06-27 ENCOUNTER — TRANSFERRED RECORDS (OUTPATIENT)
Dept: HEALTH INFORMATION MANAGEMENT | Facility: CLINIC | Age: 43
End: 2023-06-27
Payer: COMMERCIAL

## 2023-06-28 DIAGNOSIS — Z82.49 FAMILY HISTORY OF CORONARY ARTERY DISEASE: Primary | ICD-10-CM

## 2023-07-19 ENCOUNTER — ANCILLARY ORDERS (OUTPATIENT)
Dept: CARDIOLOGY | Facility: CLINIC | Age: 43
End: 2023-07-19

## 2023-07-19 DIAGNOSIS — Z82.49 FAMILY HISTORY OF CORONARY ARTERY DISEASE: ICD-10-CM

## 2023-07-21 ENCOUNTER — HOSPITAL ENCOUNTER (OUTPATIENT)
Dept: CARDIOLOGY | Facility: CLINIC | Age: 43
Discharge: HOME | End: 2023-07-21
Attending: FAMILY MEDICINE | Admitting: FAMILY MEDICINE

## 2023-07-21 DIAGNOSIS — Z82.49 FAMILY HISTORY OF CORONARY ARTERY DISEASE: ICD-10-CM

## 2023-07-21 PROCEDURE — 75571 CT HRT W/O DYE W/CA TEST: CPT | Mod: 26 | Performed by: INTERNAL MEDICINE

## 2023-07-21 PROCEDURE — 75571 CT HRT W/O DYE W/CA TEST: CPT | Mod: GA
